# Patient Record
Sex: MALE
[De-identification: names, ages, dates, MRNs, and addresses within clinical notes are randomized per-mention and may not be internally consistent; named-entity substitution may affect disease eponyms.]

---

## 2020-10-27 ENCOUNTER — HOSPITAL ENCOUNTER (OUTPATIENT)
Dept: HOSPITAL 47 - RADCTMAIN | Age: 85
Discharge: HOME | End: 2020-10-27
Attending: NURSE PRACTITIONER
Payer: OTHER GOVERNMENT

## 2020-10-27 DIAGNOSIS — R51.9: Primary | ICD-10-CM

## 2020-10-27 PROCEDURE — 70450 CT HEAD/BRAIN W/O DYE: CPT

## 2020-10-27 NOTE — CT
EXAMINATION TYPE: CT brain wo con

 

DATE OF EXAM: 10/27/2020

 

COMPARISON: None

 

HISTORY: Headache

 

CT DLP: 979.80 mGycm

Automated exposure control for dose reduction was used. Helical imaging through the brain

 

FINDINGS: 

Cortical atrophy is likely age-related. Periventricular white matter shows patchy low attenuation. Th
ere is no hemorrhage or hydrocephalus evident. No mass effect. Cerebral vascular calcifications are p
resent. The calvarium is intact. Paranasal sinuses and mastoid air cells as visualized are well aerat
ed. The orbits show symmetric appearance.

 

IMPRESSION: 

NO ACUTE ABNORMALITY. AGE-RELATED CHANGES OF ATROPHY AND PROBABLE CHRONIC SMALL VESSEL ISCHEMIA.

## 2021-05-26 ENCOUNTER — HOSPITAL ENCOUNTER (OUTPATIENT)
Dept: HOSPITAL 47 - OR | Age: 86
Discharge: HOME | End: 2021-05-26
Attending: ORTHOPAEDIC SURGERY
Payer: OTHER GOVERNMENT

## 2021-05-26 VITALS — SYSTOLIC BLOOD PRESSURE: 130 MMHG | DIASTOLIC BLOOD PRESSURE: 72 MMHG | HEART RATE: 70 BPM

## 2021-05-26 VITALS — BODY MASS INDEX: 23.1 KG/M2

## 2021-05-26 VITALS — TEMPERATURE: 97.6 F | RESPIRATION RATE: 16 BRPM

## 2021-05-26 DIAGNOSIS — E78.5: ICD-10-CM

## 2021-05-26 DIAGNOSIS — G56.02: Primary | ICD-10-CM

## 2021-05-26 DIAGNOSIS — Z98.890: ICD-10-CM

## 2021-05-26 DIAGNOSIS — E11.9: ICD-10-CM

## 2021-05-26 DIAGNOSIS — Z90.49: ICD-10-CM

## 2021-05-26 DIAGNOSIS — E78.00: ICD-10-CM

## 2021-05-26 DIAGNOSIS — Z79.899: ICD-10-CM

## 2021-05-26 DIAGNOSIS — F32.9: ICD-10-CM

## 2021-05-26 DIAGNOSIS — M19.90: ICD-10-CM

## 2021-05-26 DIAGNOSIS — Z82.49: ICD-10-CM

## 2021-05-26 DIAGNOSIS — Z80.9: ICD-10-CM

## 2021-05-26 LAB — GLUCOSE BLD-MCNC: 113 MG/DL (ref 75–99)

## 2021-05-26 PROCEDURE — 64721 CARPAL TUNNEL SURGERY: CPT

## 2021-05-26 NOTE — HP
HISTORY AND PHYSICAL



CHIEF COMPLAINT:

Left wrist pain/hand numbness.



HISTORY OF PRESENT ILLNESS:

Patient is an 85-year-old, right-hand dominant, retired gentleman who presents with

progressive left hand pain and numbness for the past year.  He denies any injury.  He

notes index, middle and ring finger numbness.  He has tried wearing braces without much

relief.  He notes significant night symptoms.  In addition, he is having a difficult

time with gripping and grasping.



PAST MEDICAL HISTORY:

Significant for arthritis, depression, hypercholesterolemia.



PAST SURGICAL HISTORY:

Significant for left ankle surgery and cholecystectomy.



CURRENT MEDICATIONS:

Lovastatin.



ALLERGIES:

He denies drug allergies.



FAMILY HISTORY:

Significant for heart disease and cancer.



SOCIAL HISTORY:

Negative for current tobacco or alcohol use.



REVIEW OF SYSTEMS:

Sixteen-point review of systems otherwise reviewed and is noncontributory.



PHYSICAL EXAMINATION:

On examination, the patient is approximately 6 feet 2 inches, 189 pounds, of

mesomorphic habitus.

HEENT exam is nonfocal.

Neck is supple.

He is nontender about the left shoulder and elbow.  On examination of his left wrist,

he has a positive Tinel's over the carpal canal.  Moderate thenar wasting is noted.

Abductors pollicis brevis strength 5 minus over 5.  Light touch is diminished in the

left index, middle and ring finger.  He has full digital range of motion.



IMPRESSION:

Symptomatic left carpal tunnel syndrome.



RECOMMENDATIONS:

I talked to the patient at length regarding his condition and treatment options.  At

this point, he is quite symptomatic and limited because of pain and numbness despite

previous conservative measures.  After thorough discussion, he opts to proceed with

surgery.  We will plan to proceed with left carpal tunnel release utilizing local

anesthetic and IV sedation.  We will perform that as an outpatient procedure.  Risks

and benefits were discussed at length in layman's terms.





MMODL / IJN: [id number] / Job#: [id number]

## 2021-05-26 NOTE — P.OP
Date of Procedure: 05/26/21


Preoperative Diagnosis: 


Left carpal tunnel syndromesymptomatic


Postoperative Diagnosis: 


Same


Procedure(s) Performed: 


Left carpal tunnel release


Anesthesia: MAC, local


Surgeon: Julian Benton


Estimated Blood Loss (ml): 1


Pathology: none sent


Condition: stable


Disposition: PACU


Indications for Procedure: 


The patient's 85-year-old male who presents with persistent/progressive left 

hand pain and numbness secondary to carpal tunnel syndrome despite conservative 

measures.  Discussion of the risks and benefits of operative intervention was 

made with patient.  He opted to proceed.  Operative risks to include infection, 

neurovascular injury, development of blood clots, possible incomplete resolution

of symptoms, possible recurrence of symptoms and need for subsequent procedures 

was discussed.  Informed consent was obtained.


Operative Findings: 


As below


Description of Procedure: 


The patient was brought to the operating room, and after induction of IV 

sedation the left upper extremity was prepped and draped in normal fashion.  The

proposed incision site was outlined skin marker in line with the radial aspect 

the fourth ray extending from the volar wrist crease distally 2-1/2 cm.  One 

quarter percent plain Marcaine was injected into the proposed incision site.  9 

mL was utilized.  The tourniquet was inflated to 250 mmHg.  The skin incision 

was then made.  The skin was incised sharply.  Subcutaneous tissues were divided

sharply the superficial palmar fascia was identified and split in line with the 

skin incision.  The transverse carpal ligament was identified and transected 

under direct visualization distally to level the palmar fat pad.  Proximal was 

taken level of the volar wrist crease.  A plane above and below the transverse 

carpal ligament was then bluntly developed with tenotomies.  The confluence of 

the distal forearm fascia and the transverse carpal ligament was then transected

under direct visualization proximally with the tines pointed in the ulnar 

direction.  I felt this was adequate proximal release.  Neural lysis was not 

performed.  The wound was irrigated with normal saline.  Electrocautery was used

for hemostasis.  The skin was reapproximated with simple 3-0 nylon sutures.  A 

sterile dressing was applied.  The tourniquet was deflated with less than 15 

minutes total tourniquet time.  Patient was awoken from sedation and transferred

to the recovery room in good condition.  Blood loss was estimated 1 mL.  No 

complications were incurred.  Sponge and needle counts were correct at the end 

the case.

## 2023-01-22 ENCOUNTER — HOSPITAL ENCOUNTER (EMERGENCY)
Dept: HOSPITAL 47 - EC | Age: 88
Discharge: OTHER INSTITUTION | End: 2023-01-22
Payer: OTHER GOVERNMENT

## 2023-01-22 VITALS — TEMPERATURE: 98 F

## 2023-01-22 VITALS — RESPIRATION RATE: 18 BRPM

## 2023-01-22 VITALS — HEART RATE: 86 BPM

## 2023-01-22 VITALS — SYSTOLIC BLOOD PRESSURE: 134 MMHG | DIASTOLIC BLOOD PRESSURE: 76 MMHG

## 2023-01-22 DIAGNOSIS — M54.6: Primary | ICD-10-CM

## 2023-01-22 DIAGNOSIS — Z79.899: ICD-10-CM

## 2023-01-22 DIAGNOSIS — E11.9: ICD-10-CM

## 2023-01-22 DIAGNOSIS — E78.5: ICD-10-CM

## 2023-01-22 DIAGNOSIS — Z90.49: ICD-10-CM

## 2023-01-22 DIAGNOSIS — Z20.822: ICD-10-CM

## 2023-01-22 LAB
ALBUMIN SERPL-MCNC: 3.7 G/DL (ref 3.5–5)
ALP SERPL-CCNC: 108 U/L (ref 38–126)
ALT SERPL-CCNC: 24 U/L (ref 4–49)
ANION GAP SERPL CALC-SCNC: 6 MMOL/L
AST SERPL-CCNC: 24 U/L (ref 17–59)
BASOPHILS # BLD AUTO: 0 K/UL (ref 0–0.2)
BASOPHILS NFR BLD AUTO: 0 %
BUN SERPL-SCNC: 22 MG/DL (ref 9–20)
CALCIUM SPEC-MCNC: 8.9 MG/DL (ref 8.4–10.2)
CHLORIDE SERPL-SCNC: 104 MMOL/L (ref 98–107)
CO2 SERPL-SCNC: 28 MMOL/L (ref 22–30)
EOSINOPHIL # BLD AUTO: 0 K/UL (ref 0–0.7)
EOSINOPHIL NFR BLD AUTO: 1 %
ERYTHROCYTE [DISTWIDTH] IN BLOOD BY AUTOMATED COUNT: 3.38 M/UL (ref 4.3–5.9)
ERYTHROCYTE [DISTWIDTH] IN BLOOD: 14.1 % (ref 11.5–15.5)
GLUCOSE SERPL-MCNC: 136 MG/DL (ref 74–99)
HCT VFR BLD AUTO: 34.8 % (ref 39–53)
HGB BLD-MCNC: 11.9 GM/DL (ref 13–17.5)
LYMPHOCYTES # SPEC AUTO: 0.7 K/UL (ref 1–4.8)
LYMPHOCYTES NFR SPEC AUTO: 17 %
MAGNESIUM SPEC-SCNC: 2.3 MG/DL (ref 1.6–2.3)
MCH RBC QN AUTO: 35 PG (ref 25–35)
MCHC RBC AUTO-ENTMCNC: 34.1 G/DL (ref 31–37)
MCV RBC AUTO: 102.9 FL (ref 80–100)
MONOCYTES # BLD AUTO: 0.2 K/UL (ref 0–1)
MONOCYTES NFR BLD AUTO: 5 %
NEUTROPHILS # BLD AUTO: 3.2 K/UL (ref 1.3–7.7)
NEUTROPHILS NFR BLD AUTO: 76 %
PH UR: 6.5 [PH] (ref 5–8)
PLATELET # BLD AUTO: 160 K/UL (ref 150–450)
POTASSIUM SERPL-SCNC: 4.1 MMOL/L (ref 3.5–5.1)
PROT SERPL-MCNC: 6.6 G/DL (ref 6.3–8.2)
PROT UR QL: (no result)
RBC UR QL: 30 /HPF (ref 0–5)
SODIUM SERPL-SCNC: 138 MMOL/L (ref 137–145)
SP GR UR: 1.02 (ref 1–1.03)
SQUAMOUS UR QL AUTO: <1 /HPF (ref 0–4)
UROBILINOGEN UR QL STRIP: <2 MG/DL (ref ?–2)
WBC # BLD AUTO: 4.1 K/UL (ref 3.8–10.6)
WBC # UR AUTO: 101 /HPF (ref 0–5)

## 2023-01-22 PROCEDURE — 87077 CULTURE AEROBIC IDENTIFY: CPT

## 2023-01-22 PROCEDURE — 87186 SC STD MICRODIL/AGAR DIL: CPT

## 2023-01-22 PROCEDURE — 96361 HYDRATE IV INFUSION ADD-ON: CPT

## 2023-01-22 PROCEDURE — 72131 CT LUMBAR SPINE W/O DYE: CPT

## 2023-01-22 PROCEDURE — 80053 COMPREHEN METABOLIC PANEL: CPT

## 2023-01-22 PROCEDURE — 72128 CT CHEST SPINE W/O DYE: CPT

## 2023-01-22 PROCEDURE — 81001 URINALYSIS AUTO W/SCOPE: CPT

## 2023-01-22 PROCEDURE — 96375 TX/PRO/DX INJ NEW DRUG ADDON: CPT

## 2023-01-22 PROCEDURE — 71046 X-RAY EXAM CHEST 2 VIEWS: CPT

## 2023-01-22 PROCEDURE — 83735 ASSAY OF MAGNESIUM: CPT

## 2023-01-22 PROCEDURE — 87636 SARSCOV2 & INF A&B AMP PRB: CPT

## 2023-01-22 PROCEDURE — 36415 COLL VENOUS BLD VENIPUNCTURE: CPT

## 2023-01-22 PROCEDURE — 93005 ELECTROCARDIOGRAM TRACING: CPT

## 2023-01-22 PROCEDURE — 96374 THER/PROPH/DIAG INJ IV PUSH: CPT

## 2023-01-22 PROCEDURE — 99285 EMERGENCY DEPT VISIT HI MDM: CPT

## 2023-01-22 PROCEDURE — 87086 URINE CULTURE/COLONY COUNT: CPT

## 2023-01-22 PROCEDURE — 85025 COMPLETE CBC W/AUTO DIFF WBC: CPT

## 2023-01-22 PROCEDURE — 74018 RADEX ABDOMEN 1 VIEW: CPT

## 2023-01-22 NOTE — P.PN
Progress Note - Text


Progress Note Date: 01/22/23





I was contacted by the ED regarding this patient 1/22/23 at 1739 since I am 

covering for general orthopaedic call. I informed them that I do not have spine 

privileges at this facility and am in no way affiliated with Dr. Biswas who 

recently performed a spine procedure on this patient. It's documented in the 

chart that I was contacted regarding this patient. I had no involvement in any 

capacity regarding the care of this patient. I did not make any medical, 

treatment or disposition decisions on this patient. I recommended the ED contact

Dr. Biswas.

## 2023-01-22 NOTE — XR
EXAMINATION TYPE: XR KUB

 

DATE OF EXAM: 1/22/2023

 

COMPARISON: None

 

INDICATION: Abdominal distention

 

TECHNIQUE: Single view abdomen frontal projection

 

FINDINGS:  

There is a normal colonic bowel gas pattern. Fecal debris is within the transverse colon. No mass eff
ect is evident.

Psoas margins are normal.

No organomegaly is present.

Phleboliths are within the pelvis. A right hip pin is present. A 0.8 cm right renal stone is not excl
uded. This could be within fecal debris.

 

IMPRESSION: 

1. Mild fecal retention.

2. A 0.8 cm right renal stone may be present.

## 2023-01-22 NOTE — XR
EXAMINATION TYPE: XR chest 2V

 

DATE OF EXAM: 1/22/2023

 

COMPARISON: 8/23/2016

 

HISTORY: Weakness

 

TECHNIQUE: 2 views

 

FINDINGS: There is minimal linear density left lung base. No heart failure. Heart size is normal. The
re is old right-sided healed rib fractures. There is old lower thoracic compression fracture at T12 w
ith 75% wedging and vertebral plasty.

 

IMPRESSION: There is some minimal atelectasis left lung base which appears new compared to old exam. 
No heart failure.

## 2023-01-22 NOTE — CT
EXAMINATION TYPE: CT thor lumbar spine wo con

 

DATE OF EXAM: 1/22/2023

 

COMPARISON: None

 

HISTORY: Back pain, weakness, cannot stand.

 

CT DLP: 1337.6 mGycm

Automated exposure control for dose reduction was used.

 

Images obtained from the level of T1-S1 without contrast.

 

There is T11 compression fracture with 50% loss of height. There is vertebroplasty of T11 with bone c
ement extending into the disc space at T10-11. There is anterior subluxation of T10 in relation to T1
1 with apparent bilateral T11 pedicle fractures. There is narrowing of the spinal canal at T10 level 
due to the subluxation and the compression fractures. There is moderate spinal stenosis. There is daphne
e infiltrate at both lung bases.

 

There is osteopenia. There is a mild upper thoracic kyphosis and slight anterior wedging of upper tho
racic vertebra at T5 and T4 and T3 up to 15%. No significant compression fractures seen in the lumbar
 spine.

 

IMPRESSION:

Subluxation deformity with old fracture of T11 including the posterior elements and spinal stenosis. 
This could be unstable spine. Recommend comparison with old exam. Subluxation is approximately 8 mm.

 

Osteopenia. Old mild compression fractures in the upper thoracic spine. Multilevel spondylotic change
s.

## 2023-01-22 NOTE — ED
Back Pain HPI





- General


Chief Complaint: Back Pain/Injury


Stated Complaint: Weakness


Time Seen by Provider: 01/22/23 13:48


Source: patient


Limitations: physical limitation





- History of Present Illness


Initial Comments: 


Patient is an 87-year-old male who presents to the emergency department with a 

chief complaint of back pain.  According to daughter patient had a fall in 

November resulting in fracture of T11.  Patient was taken to [facility].  Patient has had significant back pain since the injury.  No reinjury.

Taking Norco without relief.  States he is unable to lay down due to pain.  

Daughter states on January 19 Dr. Arnett performed kyphoplasty which did not i

mprove pain.  This past Friday his Norco dose was increased from 7.5 mg to 10 

mg.  Despite of this patient is still very uncomfortable.  Reports intermittent 

spasms of his lower back and new onset tingling of his feet this morning. Over 

the past week he has decreased his oral intake.  He is having trouble standing 

due to weakness and back pain.  No focal weakness.  No numbness and tingling of 

the groin.  No loss of bowel or bladder function.  Patient does not have a spine

specialist.  Fever, chills, chest pain, shortness of breath, abdominal pain, 

nausea, vomiting, burning with urination, diarrhea, constipation. 








Patient received fentanyl in the ambulance.








- Related Data


                                Home Medications











 Medication  Instructions  Recorded  Confirmed


 


Co Q-10 (Unknown Dose)  1 cap PO DAILY 08/23/16 05/25/21


 


Multivitamin/Iron/Folic Acid 1 tab PO DAILY 08/23/16 05/25/21





[Centrum Complete Multivit Tab]   


 


Vitamin B-12 (Unknown Dose) 1 tab PO DAILY 08/23/16 05/25/21


 


Calcium Carbonate [Calcium] 600 mg PO DAILY 10/07/20 05/25/21


 


Cholecalciferol [Vitamin D3 (25 1,000 unit PO DAILY 10/07/20 05/25/21





Mcg = 1000 Iu)]   


 


Magnesium Gluconate [Magonate] 500 mg PO DAILY 10/07/20 05/25/21


 


Omega-3 Fatty Acids/Fish Oil [Fish 1 each PO DAILY 10/07/20 05/25/21





Oil 1,000 mg Softgel]   


 


Atorvastati(Unk Dose) 1 tab PO DAILY 05/25/21 05/25/21











                                    Allergies











Allergy/AdvReac Type Severity Reaction Status Date / Time


 


No Known Allergies Allergy   Verified 01/22/23 13:53














Review of Systems


ROS Statement: 


Those systems with pertinent positive or pertinent negative responses have been 

documented in the HPI.





ROS Other: All systems not noted in ROS Statement are negative.





Past Medical History


Past Medical History: Diabetes Mellitus, Hyperlipidemia, Osteoarthritis (OA), 

Prostate Disorder, Skin Disorder


Additional Past Medical History / Comment(s): States urinary frequency, Eczema, 

umbilical hernia,skin Ca,left carpel tunnel,age related forgetfulness,hip injury

after fall


History of Any Multi-Drug Resistant Organisms: None Reported


Past Surgical History: Cholecystectomy, Orthopedic Surgery


Additional Past Surgical History / Comment(s): States left ankle repair r/t fall

in 1970s


Past Anesthesia/Blood Transfusion Reactions: Family History of Problems w/ 

Anesthesia, Motion Sickness


Additional Past Anesthesia/Blood Transfusion Reaction / Comment(s): dtr has PONV


Past Psychological History: No Psychological Hx Reported


Smoking Status: Never smoker





- Past Family History


  ** Mother


Family Medical History: Diabetes Mellitus, Hypertension





  ** Father


Family Medical History: Liver Disease, Myocardial Infarction (MI)





General Exam


Limitations: physical limitation


General appearance: alert, in no apparent distress


Head exam: Present: atraumatic, normocephalic, normal inspection


Eye exam: Present: normal appearance, PERRL, EOMI.  Absent: scleral icterus, 

conjunctival injection, periorbital swelling


Respiratory exam: Present: normal lung sounds bilaterally.  Absent: respiratory 

distress, wheezes, rales, rhonchi, stridor


Cardiovascular Exam: Present: regular rate, normal rhythm, normal heart sounds. 

Absent: systolic murmur, diastolic murmur, rubs, gallop, clicks


GI/Abdominal exam: Present: soft, distended (moderate), normal bowel sounds, 

hernia (Umbilical, nontender to palpation.).  Absent: tenderness, guarding, 

rebound, rigid


Extremities exam: Present: normal inspection


Back exam: Present: normal inspection, paraspinal tenderness (thoracic/lumbar), 

vertebral tenderness (thoracic/lumbar)


Neurological exam: Present: alert, oriented X3, CN II-XII intact


  ** Expanded


Sensory exam: Upper Extremity Light Touch: Normal, Lower Extremity Light Touch: 

Normal


Motor strength exam: RUE: 5, LUE: 5, RLE: 5, LLE: 5


Psychiatric exam: Present: normal affect, normal mood


Skin exam: Present: warm, dry, intact, normal color.  Absent: rash





Course


                                   Vital Signs











  01/22/23 01/22/23 01/22/23





  13:51 15:53 17:25


 


Temperature 98.0 F  


 


Pulse Rate 96 80 86


 


Respiratory 16 18 18





Rate   


 


Blood Pressure 127/85 140/76 146/86


 


O2 Sat by Pulse 96 98 98





Oximetry   














Medical Decision Making





- Medical Decision Making


Was pt. sent in by a medical professional or institution (, PA, NP, urgent 

care, hospital, or nursing home...) When possible be specific


@  -[No]


Did you speak to anyone other than the patient for history (EMS, parent, family,

 police, friend...)? What history was obtained from this source 


@  -Patient's daughter


Did you review nursing and triage notes (agree or disagree)?  Why? 


@  -[I reviewed and agree with nursing and triage notes]


Were old charts reviewed (outside hosp., previous admission, EMS record, old 

EKG, old radiological studies, urgent care reports/EKG's, nursing home records)?

Report findings 


@  -Yes, previous spine history not available


Differential Diagnosis (chest pain, altered mental status, abdominal pain women,

abdominal pain men, vaginal bleeding, weakness, fever, dyspnea, syncope, 

headache, dizziness, GI bleed, back pain, seizure, CVA, palpatations, mental 

health)? 


@  -Differential Weakness:


Hypoglycemia, shock, sepsis, hyponatremia, anemia, infection, MI, ETOH, adverse 

medicine reaction, overdose, stroke, this is not meant to be an all-inclusive 

list.


EKG interpreted by me (3pts min.).


@  -Yes, sinus rhythm.  Right bundle branch block.  Ventricular rate 77, WI 

interval 207, QRS duration 155, 


X-rays interpreted by me (1pt min.).


@  Yes, KUB x-ray shows mild fecal retention.  Chest x-ray negative for acute 

process.


CT interpreted by me (1pt min.).


@  -Yes, CT of the thoracic lumbar spine without contrast shows subluxation 

deformity with old fracture of T11 including the posterior elements and spinal 

stenosis, possibly reflecting an unstable spine


U/S interpreted by me (1pt. min.).


@  -[None done]


What testing was considered but not performed or refused? (CT, X-rays, U/S, 

labs)? Why?


@  -[None]


What meds were considered but not given or refused? Why?


@  -[None]


Did you discuss the management of the patient with other professionals 

(professionals i.e. , PA, NP, lab, RT, psych nurse, , , 

teacher, , )? Give summary


@  -[No]


Was smoking cessation discussed for >3mins.?


@  -[No]


Was critical care preformed (if so, how long)?


@  -[No]


Were there social determinants of health that impacted care today? How? 

(Homelessness, low income, unemployed, alcoholism, drug addiction, 

transportation, low edu. Level, literacy, decrease access to med. care, assisted, 

rehab)?


@  -[No]


Was there de-escalation of care discussed even if they declined (Discuss DNR or 

withdrawal of care, Hospice)? DNR status


@  -[No]


What co-morbidities impacted this encounter? (DM, HTN, Smoking, COPD, CAD, 

Cancer, CVA, ARF, Chemo, Hep., AIDS, mental health diagnosis, sleep apnea, 

morbid obesity)?


@  -[None]


Was patient admitted / discharged? Hospital course, mention meds given and 

route, prescriptions, significant lab abnormalities, going to OR and other 

pertinent info.


@  -This an 87-year-old male presenting with acute on chronic back pain and new 

onset b/l foot numbness.No neurological deficits on physical exam.  CT of the 

thoracic lumbar spine without contrast shows subluxation deformity with old 

fracture of T11 including the posterior elements and spinal stenosis, possibly 

reflecting an unstable spine.  I discussed case with Dr. Arnett who recommended

 admission with consult to orthopedic spine specialist.  Case discussed with Dr. Bhatt who declines consult-recommends Dr. Arnett follow this case for 

continuation of care.  This was discussed with patient and his daughter who 

refuse further management with Dr. Arnett due to dissatisfaction with 

kyphoplasty procedure.  I spoke with Dr. Bhatt again who recommends transfer for

 neurosurgical evaluation due to acute on chronic back pain with new 

neurological symptoms and concerning CT. Daughter requests transfer to [facility].  Case discussed with neurosurgeon  Dr. Ordaz and ED attending [facility] who accept transfer.  Patient transferred in stable condition


Undiagnosed new problem with uncertain prognosis?


@  -[No]


Drug Therapy requiring intensive monitoring for toxicity (Heparin, Nitro, 

Insulin, Cardizem)?


@  -[No]


Were any procedures done?


@  -[No]


Diagnosis/symptom?


@  -Back pain


Acute, or Chronic, or Acute on Chronic?


@  -Acute on chronic 


Uncomplicated (without systemic symptoms) or Complicated (systemic symptoms)?


@  Uncomplicated


Side effects of treatment?


@  -[none]


Exacerbation, Progression, or Severe Exacerbation]


@  -[no]


Poses a threat to life or bodily function?


@  -[no]








 is my attending





- Lab Data


Result diagrams: 


                                 01/22/23 14:12





                                 01/22/23 14:12


                                   Lab Results











  01/22/23 01/22/23 01/22/23 Range/Units





  14:12 14:12 14:15 


 


WBC  4.1    (3.8-10.6)  k/uL


 


RBC  3.38 L    (4.30-5.90)  m/uL


 


Hgb  11.9 L    (13.0-17.5)  gm/dL


 


Hct  34.8 L    (39.0-53.0)  %


 


MCV  102.9 H    (80.0-100.0)  fL


 


MCH  35.0    (25.0-35.0)  pg


 


MCHC  34.1    (31.0-37.0)  g/dL


 


RDW  14.1    (11.5-15.5)  %


 


Plt Count  160    (150-450)  k/uL


 


MPV  8.0    


 


Neutrophils %  76    %


 


Lymphocytes %  17    %


 


Monocytes %  5    %


 


Eosinophils %  1    %


 


Basophils %  0    %


 


Neutrophils #  3.2    (1.3-7.7)  k/uL


 


Lymphocytes #  0.7 L    (1.0-4.8)  k/uL


 


Monocytes #  0.2    (0-1.0)  k/uL


 


Eosinophils #  0.0    (0-0.7)  k/uL


 


Basophils #  0.0    (0-0.2)  k/uL


 


Macrocytosis  Slight    


 


Sodium   138   (137-145)  mmol/L


 


Potassium   4.1   (3.5-5.1)  mmol/L


 


Chloride   104   ()  mmol/L


 


Carbon Dioxide   28   (22-30)  mmol/L


 


Anion Gap   6   mmol/L


 


BUN   22 H   (9-20)  mg/dL


 


Creatinine   0.68   (0.66-1.25)  mg/dL


 


Est GFR (CKD-EPI)AfAm   >90   (>60 ml/min/1.73 sqM)  


 


Est GFR (CKD-EPI)NonAf   86   (>60 ml/min/1.73 sqM)  


 


Glucose   136 H   (74-99)  mg/dL


 


Calcium   8.9   (8.4-10.2)  mg/dL


 


Magnesium   2.3   (1.6-2.3)  mg/dL


 


Total Bilirubin   0.7   (0.2-1.3)  mg/dL


 


AST   24   (17-59)  U/L


 


ALT   24   (4-49)  U/L


 


Alkaline Phosphatase   108   ()  U/L


 


Total Protein   6.6   (6.3-8.2)  g/dL


 


Albumin   3.7   (3.5-5.0)  g/dL


 


Urine Color     


 


Urine Appearance     (Clear)  


 


Urine pH     (5.0-8.0)  


 


Ur Specific Gravity     (1.001-1.035)  


 


Urine Protein     (Negative)  


 


Urine Glucose (UA)     (Negative)  


 


Urine Ketones     (Negative)  


 


Urine Blood     (Negative)  


 


Urine Nitrite     (Negative)  


 


Urine Bilirubin     (Negative)  


 


Urine Urobilinogen     (<2.0)  mg/dL


 


Ur Leukocyte Esterase     (Negative)  


 


Urine RBC     (0-5)  /hpf


 


Urine WBC     (0-5)  /hpf


 


Ur Squamous Epith Cells     (0-4)  /hpf


 


Urine Bacteria     (None)  /hpf


 


Urine Mucus     (None)  /hpf


 


Influenza Type A (PCR)    Not Detected  (Not Detectd)  


 


Influenza Type B (PCR)    Not Detected  (Not Detectd)  


 


RSV (PCR)    Not Detected  (Not Detectd)  


 


SARS-CoV-2 (PCR)    Not Detected  (Not Detectd)  














  01/22/23 Range/Units





  16:10 


 


WBC   (3.8-10.6)  k/uL


 


RBC   (4.30-5.90)  m/uL


 


Hgb   (13.0-17.5)  gm/dL


 


Hct   (39.0-53.0)  %


 


MCV   (80.0-100.0)  fL


 


MCH   (25.0-35.0)  pg


 


MCHC   (31.0-37.0)  g/dL


 


RDW   (11.5-15.5)  %


 


Plt Count   (150-450)  k/uL


 


MPV   


 


Neutrophils %   %


 


Lymphocytes %   %


 


Monocytes %   %


 


Eosinophils %   %


 


Basophils %   %


 


Neutrophils #   (1.3-7.7)  k/uL


 


Lymphocytes #   (1.0-4.8)  k/uL


 


Monocytes #   (0-1.0)  k/uL


 


Eosinophils #   (0-0.7)  k/uL


 


Basophils #   (0-0.2)  k/uL


 


Macrocytosis   


 


Sodium   (137-145)  mmol/L


 


Potassium   (3.5-5.1)  mmol/L


 


Chloride   ()  mmol/L


 


Carbon Dioxide   (22-30)  mmol/L


 


Anion Gap   mmol/L


 


BUN   (9-20)  mg/dL


 


Creatinine   (0.66-1.25)  mg/dL


 


Est GFR (CKD-EPI)AfAm   (>60 ml/min/1.73 sqM)  


 


Est GFR (CKD-EPI)NonAf   (>60 ml/min/1.73 sqM)  


 


Glucose   (74-99)  mg/dL


 


Calcium   (8.4-10.2)  mg/dL


 


Magnesium   (1.6-2.3)  mg/dL


 


Total Bilirubin   (0.2-1.3)  mg/dL


 


AST   (17-59)  U/L


 


ALT   (4-49)  U/L


 


Alkaline Phosphatase   ()  U/L


 


Total Protein   (6.3-8.2)  g/dL


 


Albumin   (3.5-5.0)  g/dL


 


Urine Color  Yellow  


 


Urine Appearance  Cloudy  (Clear)  


 


Urine pH  6.5  (5.0-8.0)  


 


Ur Specific Gravity  1.016  (1.001-1.035)  


 


Urine Protein  1+ H  (Negative)  


 


Urine Glucose (UA)  Negative  (Negative)  


 


Urine Ketones  Negative  (Negative)  


 


Urine Blood  Moderate H  (Negative)  


 


Urine Nitrite  Negative  (Negative)  


 


Urine Bilirubin  Negative  (Negative)  


 


Urine Urobilinogen  <2.0  (<2.0)  mg/dL


 


Ur Leukocyte Esterase  Large H  (Negative)  


 


Urine RBC  30 H  (0-5)  /hpf


 


Urine WBC  101 H  (0-5)  /hpf


 


Ur Squamous Epith Cells  <1  (0-4)  /hpf


 


Urine Bacteria  Moderate H  (None)  /hpf


 


Urine Mucus  Rare H  (None)  /hpf


 


Influenza Type A (PCR)   (Not Detectd)  


 


Influenza Type B (PCR)   (Not Detectd)  


 


RSV (PCR)   (Not Detectd)  


 


SARS-CoV-2 (PCR)   (Not Detectd)  














Disposition


Clinical Impression: 


 Back pain





Disposition: OTHER INSTITUTION NOT DEFINED


Referrals: 


[facility] [Primary Care Provider] - 1-2 days





- Out of Hospital Transfer - Req. Specs


Out of Hospital Transfer - Requested Specifics: Other Emergency Center ([facility])